# Patient Record
Sex: MALE | Race: WHITE | NOT HISPANIC OR LATINO | URBAN - METROPOLITAN AREA
[De-identification: names, ages, dates, MRNs, and addresses within clinical notes are randomized per-mention and may not be internally consistent; named-entity substitution may affect disease eponyms.]

---

## 2018-08-31 ENCOUNTER — EMERGENCY (EMERGENCY)
Facility: HOSPITAL | Age: 83
LOS: 1 days | End: 2018-08-31
Attending: EMERGENCY MEDICINE
Payer: MEDICARE

## 2018-08-31 VITALS
TEMPERATURE: 98 F | HEIGHT: 70 IN | SYSTOLIC BLOOD PRESSURE: 181 MMHG | DIASTOLIC BLOOD PRESSURE: 85 MMHG | HEART RATE: 61 BPM | WEIGHT: 179.9 LBS | OXYGEN SATURATION: 95 % | RESPIRATION RATE: 18 BRPM

## 2018-08-31 DIAGNOSIS — Z95.0 PRESENCE OF CARDIAC PACEMAKER: Chronic | ICD-10-CM

## 2018-08-31 PROBLEM — Z00.00 ENCOUNTER FOR PREVENTIVE HEALTH EXAMINATION: Status: ACTIVE | Noted: 2018-08-31

## 2018-08-31 LAB
ALBUMIN SERPL ELPH-MCNC: 4.3 G/DL — SIGNIFICANT CHANGE UP (ref 3.3–5)
ALP SERPL-CCNC: 43 U/L — SIGNIFICANT CHANGE UP (ref 40–120)
ALT FLD-CCNC: 17 U/L — SIGNIFICANT CHANGE UP (ref 10–45)
ANION GAP SERPL CALC-SCNC: 16 MMOL/L — SIGNIFICANT CHANGE UP (ref 5–17)
AST SERPL-CCNC: 23 U/L — SIGNIFICANT CHANGE UP (ref 10–40)
BASE EXCESS BLDV CALC-SCNC: -0.5 MMOL/L — SIGNIFICANT CHANGE UP (ref -2–2)
BASOPHILS # BLD AUTO: 0 K/UL — SIGNIFICANT CHANGE UP (ref 0–0.2)
BASOPHILS NFR BLD AUTO: 0.2 % — SIGNIFICANT CHANGE UP (ref 0–2)
BILIRUB SERPL-MCNC: 0.6 MG/DL — SIGNIFICANT CHANGE UP (ref 0.2–1.2)
BUN SERPL-MCNC: 17 MG/DL — SIGNIFICANT CHANGE UP (ref 7–23)
CA-I SERPL-SCNC: 1.18 MMOL/L — SIGNIFICANT CHANGE UP (ref 1.12–1.3)
CALCIUM SERPL-MCNC: 9.4 MG/DL — SIGNIFICANT CHANGE UP (ref 8.4–10.5)
CHLORIDE BLDV-SCNC: 107 MMOL/L — SIGNIFICANT CHANGE UP (ref 96–108)
CHLORIDE SERPL-SCNC: 101 MMOL/L — SIGNIFICANT CHANGE UP (ref 96–108)
CO2 BLDV-SCNC: 28 MMOL/L — SIGNIFICANT CHANGE UP (ref 22–30)
CO2 SERPL-SCNC: 21 MMOL/L — LOW (ref 22–31)
CREAT SERPL-MCNC: 1.03 MG/DL — SIGNIFICANT CHANGE UP (ref 0.5–1.3)
EOSINOPHIL # BLD AUTO: 0 K/UL — SIGNIFICANT CHANGE UP (ref 0–0.5)
EOSINOPHIL NFR BLD AUTO: 0.3 % — SIGNIFICANT CHANGE UP (ref 0–6)
GAS PNL BLDV: 135 MMOL/L — LOW (ref 136–145)
GAS PNL BLDV: SIGNIFICANT CHANGE UP
GAS PNL BLDV: SIGNIFICANT CHANGE UP
GLUCOSE BLDV-MCNC: 125 MG/DL — HIGH (ref 70–99)
GLUCOSE SERPL-MCNC: 123 MG/DL — HIGH (ref 70–99)
HCO3 BLDV-SCNC: 26 MMOL/L — SIGNIFICANT CHANGE UP (ref 21–29)
HCT VFR BLD CALC: 44.2 % — SIGNIFICANT CHANGE UP (ref 39–50)
HCT VFR BLDA CALC: 45 % — SIGNIFICANT CHANGE UP (ref 39–50)
HGB BLD CALC-MCNC: 14.8 G/DL — SIGNIFICANT CHANGE UP (ref 13–17)
HGB BLD-MCNC: 14.7 G/DL — SIGNIFICANT CHANGE UP (ref 13–17)
HOROWITZ INDEX BLDV+IHG-RTO: SIGNIFICANT CHANGE UP
LACTATE BLDV-MCNC: 2.4 MMOL/L — HIGH (ref 0.7–2)
LIDOCAIN IGE QN: 105 U/L — HIGH (ref 7–60)
LYMPHOCYTES # BLD AUTO: 0.9 K/UL — LOW (ref 1–3.3)
LYMPHOCYTES # BLD AUTO: 7.5 % — LOW (ref 13–44)
MCHC RBC-ENTMCNC: 32.8 PG — SIGNIFICANT CHANGE UP (ref 27–34)
MCHC RBC-ENTMCNC: 33.1 GM/DL — SIGNIFICANT CHANGE UP (ref 32–36)
MCV RBC AUTO: 98.9 FL — SIGNIFICANT CHANGE UP (ref 80–100)
MONOCYTES # BLD AUTO: 0.5 K/UL — SIGNIFICANT CHANGE UP (ref 0–0.9)
MONOCYTES NFR BLD AUTO: 4.5 % — SIGNIFICANT CHANGE UP (ref 2–14)
NEUTROPHILS # BLD AUTO: 10 K/UL — HIGH (ref 1.8–7.4)
NEUTROPHILS NFR BLD AUTO: 87.5 % — HIGH (ref 43–77)
PCO2 BLDV: 53 MMHG — HIGH (ref 35–50)
PH BLDV: 7.31 — LOW (ref 7.35–7.45)
PLATELET # BLD AUTO: 204 K/UL — SIGNIFICANT CHANGE UP (ref 150–400)
PO2 BLDV: 34 MMHG — SIGNIFICANT CHANGE UP (ref 25–45)
POTASSIUM BLDV-SCNC: 4.4 MMOL/L — SIGNIFICANT CHANGE UP (ref 3.5–5.3)
POTASSIUM SERPL-MCNC: 4.6 MMOL/L — SIGNIFICANT CHANGE UP (ref 3.5–5.3)
POTASSIUM SERPL-SCNC: 4.6 MMOL/L — SIGNIFICANT CHANGE UP (ref 3.5–5.3)
PROT SERPL-MCNC: 8 G/DL — SIGNIFICANT CHANGE UP (ref 6–8.3)
RBC # BLD: 4.47 M/UL — SIGNIFICANT CHANGE UP (ref 4.2–5.8)
RBC # FLD: 12.8 % — SIGNIFICANT CHANGE UP (ref 10.3–14.5)
SAO2 % BLDV: 54 % — LOW (ref 67–88)
SODIUM SERPL-SCNC: 138 MMOL/L — SIGNIFICANT CHANGE UP (ref 135–145)
TROPONIN T, HIGH SENSITIVITY RESULT: 29 NG/L — SIGNIFICANT CHANGE UP (ref 0–51)
WBC # BLD: 11.4 K/UL — HIGH (ref 3.8–10.5)
WBC # FLD AUTO: 11.4 K/UL — HIGH (ref 3.8–10.5)

## 2018-08-31 PROCEDURE — 96374 THER/PROPH/DIAG INJ IV PUSH: CPT | Mod: XU

## 2018-08-31 PROCEDURE — 99218: CPT

## 2018-08-31 PROCEDURE — 70450 CT HEAD/BRAIN W/O DYE: CPT

## 2018-08-31 PROCEDURE — 85027 COMPLETE CBC AUTOMATED: CPT

## 2018-08-31 PROCEDURE — 93010 ELECTROCARDIOGRAM REPORT: CPT

## 2018-08-31 PROCEDURE — 96361 HYDRATE IV INFUSION ADD-ON: CPT

## 2018-08-31 PROCEDURE — 82803 BLOOD GASES ANY COMBINATION: CPT

## 2018-08-31 PROCEDURE — 71045 X-RAY EXAM CHEST 1 VIEW: CPT

## 2018-08-31 PROCEDURE — 83690 ASSAY OF LIPASE: CPT

## 2018-08-31 PROCEDURE — 84295 ASSAY OF SERUM SODIUM: CPT

## 2018-08-31 PROCEDURE — 85014 HEMATOCRIT: CPT

## 2018-08-31 PROCEDURE — 99284 EMERGENCY DEPT VISIT MOD MDM: CPT | Mod: 25

## 2018-08-31 PROCEDURE — 74177 CT ABD & PELVIS W/CONTRAST: CPT | Mod: 26

## 2018-08-31 PROCEDURE — 71045 X-RAY EXAM CHEST 1 VIEW: CPT | Mod: 26

## 2018-08-31 PROCEDURE — 82947 ASSAY GLUCOSE BLOOD QUANT: CPT

## 2018-08-31 PROCEDURE — 74177 CT ABD & PELVIS W/CONTRAST: CPT

## 2018-08-31 PROCEDURE — 70450 CT HEAD/BRAIN W/O DYE: CPT | Mod: 26

## 2018-08-31 PROCEDURE — 80053 COMPREHEN METABOLIC PANEL: CPT

## 2018-08-31 PROCEDURE — 84484 ASSAY OF TROPONIN QUANT: CPT

## 2018-08-31 PROCEDURE — 82435 ASSAY OF BLOOD CHLORIDE: CPT

## 2018-08-31 PROCEDURE — 84132 ASSAY OF SERUM POTASSIUM: CPT

## 2018-08-31 PROCEDURE — G0378: CPT

## 2018-08-31 PROCEDURE — 93005 ELECTROCARDIOGRAM TRACING: CPT

## 2018-08-31 PROCEDURE — 83605 ASSAY OF LACTIC ACID: CPT

## 2018-08-31 PROCEDURE — 82330 ASSAY OF CALCIUM: CPT

## 2018-08-31 RX ORDER — ONDANSETRON 8 MG/1
4 TABLET, FILM COATED ORAL ONCE
Qty: 0 | Refills: 0 | Status: COMPLETED | OUTPATIENT
Start: 2018-08-31 | End: 2018-08-31

## 2018-08-31 RX ORDER — SODIUM CHLORIDE 9 MG/ML
3 INJECTION INTRAMUSCULAR; INTRAVENOUS; SUBCUTANEOUS ONCE
Qty: 0 | Refills: 0 | Status: COMPLETED | OUTPATIENT
Start: 2018-08-31 | End: 2018-08-31

## 2018-08-31 RX ORDER — MECLIZINE HCL 12.5 MG
25 TABLET ORAL ONCE
Qty: 0 | Refills: 0 | Status: COMPLETED | OUTPATIENT
Start: 2018-08-31 | End: 2018-08-31

## 2018-08-31 RX ORDER — SODIUM CHLORIDE 9 MG/ML
500 INJECTION INTRAMUSCULAR; INTRAVENOUS; SUBCUTANEOUS ONCE
Qty: 0 | Refills: 0 | Status: COMPLETED | OUTPATIENT
Start: 2018-08-31 | End: 2018-08-31

## 2018-08-31 RX ADMIN — ONDANSETRON 4 MILLIGRAM(S): 8 TABLET, FILM COATED ORAL at 17:44

## 2018-08-31 RX ADMIN — SODIUM CHLORIDE 500 MILLILITER(S): 9 INJECTION INTRAMUSCULAR; INTRAVENOUS; SUBCUTANEOUS at 17:44

## 2018-08-31 RX ADMIN — SODIUM CHLORIDE 3 MILLILITER(S): 9 INJECTION INTRAMUSCULAR; INTRAVENOUS; SUBCUTANEOUS at 22:49

## 2018-08-31 RX ADMIN — Medication 25 MILLIGRAM(S): at 17:44

## 2018-08-31 RX ADMIN — SODIUM CHLORIDE 500 MILLILITER(S): 9 INJECTION INTRAMUSCULAR; INTRAVENOUS; SUBCUTANEOUS at 18:30

## 2018-08-31 NOTE — ED CDU PROVIDER DISPOSITION NOTE - CLINICAL COURSE
83 M w HTN, DM, PPM, HLD presents w complaint of dizziness, nausea, vomiting x 6 and difficulty walking secondary to imbalance since 1330 today after eating a falafel. He the symptoms are also associated with mild diffuse abdominal pain. He ate the same meal as his son who is feeling well and accompanied him to the Emergency Room. He feels that he is ambulating better now than he was when he first arrived in the ED. He denies new hearing changes, tinnitus, fever or chills.  In ED patient treated with meclizine, zofran and ivf w/ improvement of symptoms. Labs remarkable for mild leukocytosis of 11.4, lactate 2.4, lipase 105 and trop 30 w/ delta 29. CT abd unremarkable. CT head resulted w/ scattered foci of air in  space, draining system and cavernous sinus. Neurosx was called and recommended repeat CT in 6 hours to eval resolution 83 M w HTN, DM, PPM, HLD presents w complaint of dizziness, nausea, vomiting x 6 and difficulty walking secondary to imbalance since 1330 today after eating a falafel. He the symptoms are also associated with mild diffuse abdominal pain. He ate the same meal as his son who is feeling well and accompanied him to the Emergency Room. He feels that he is ambulating better now than he was when he first arrived in the ED. He denies new hearing changes, tinnitus, fever or chills.  In ED patient treated with meclizine, zofran and ivf w/ improvement of symptoms. Labs remarkable for mild leukocytosis of 11.4, lactate 2.4, lipase 105 and trop 30 w/ delta 29. CT abd unremarkable. CT head resulted w/ scattered foci of air in  space, draining system and cavernous sinus. Neurosx was called and recommended repeat CT in 6 hours to eval resolution. CT resulted as resolution of previously seen foci of air. Discussed results w/ Dr. Kumar who paged neurosurgery to eval patient and make recommendations Dr. Ney Camejo neurosurgery has seen and evaluated patient. States that patient can be cleared for discharge given resolution of air foci as well as patient being asymptomatic. Recommends repeat head CT w/in 2 weeks to confirm. Discussed with Dr. Kumar who has seen and evaluated pt and agrees with plan for d/c home

## 2018-08-31 NOTE — ED PROVIDER NOTE - PHYSICAL EXAMINATION
Attending Syeda Rao: Gen: NAD, heent: atrauamtic, eomi, perrla, mmm, op pink, uvula midline, neck; nttp, no nuchal rigidity, chest: nttp, no crepitus, cv: rrr,  lungs: ctab, abd: soft, nontender, nondistended, no peritoneal signs, +BS, no guarding, ext: wwp, neg homans, skin: no rash, neuro: awake and alert, following commands, speech clear, sensation and strength intact, no focal deficits

## 2018-08-31 NOTE — ED CDU PROVIDER INITIAL DAY NOTE - PROGRESS NOTE DETAILS
CDU NOTE HEAVENLY Hassan: VSS NAD. Patient is resting comfortably and is without any complaints. CT resulted as resolution of previously seen foci of air. Discussed results w/ Dr. Kumar who paged neurosurgery to eval patient and make recommendations   Kimberly Hassan PA-C Dr. Ney Camejo neurosurgery has seen and evaluated patient. States that patient can be cleared for discharge given resolution of air foci as well as patient being asymptomatic. Recommends repeat head CT w/in 2 weeks to confirm. Discussed with Dr. Kumar who has seen and evaluated pt and agrees with plan for d/c home   Kimberly Hassan PA-C

## 2018-08-31 NOTE — ED CDU PROVIDER DISPOSITION NOTE - ATTENDING CONTRIBUTION TO CARE
MD Kumar:  I have personally performed a face to face diagnostic evaluation on this patient with the PA.  I have reviewed the ACP note and agree with the history, exam, and plan of care, except as noted.  History and Exam by me shows  84 yo M, sent to CDU after being worked up in the ED for dizzy episode.  HPI more c/w vertigo (room-spinning sensation) with associated nausea/vomiting, however, initial CT head demonstrated scattered foci of air in cavernous sinus.  Upon transfer to CDU his symptoms had completely resolved.  Sent CDU for interval repeat CT head, and NSGY recs (which were repeat CT).  Repeat CT demonstrated resolution of prior findings.  On exam the patient is asymptomatic, NAD, ambulates w/o difficulty.  Patient is reliable, and was given strict instructions to f/u as an outpatient with Neurology for repeat imaging in 2wk.  This CT finding is rarely indicative of significant pathology, and more likely inadvertent IV injection of air during ED phelbotomy/PIV placement.

## 2018-08-31 NOTE — ED CDU PROVIDER INITIAL DAY NOTE - DETAILS
Repeat head CT +/- neurosx, symptom control as needed, freq reeval, vitals q 4  -discussed with attending Dr. Galeano

## 2018-08-31 NOTE — ED ADULT NURSE REASSESSMENT NOTE - GENERAL PATIENT STATE
improvement verbalized/resting/sleeping/cooperative/family/SO at bedside/comfortable appearance
comfortable appearance/cooperative/family/SO at bedside/smiling/interactive

## 2018-08-31 NOTE — ED CDU PROVIDER INITIAL DAY NOTE - OBJECTIVE STATEMENT
83 M w HTN, DM, PPM, HLD presents w complaint of dizziness, nausea, vomiting x 6 and difficulty walking secondary to imbalance since 1330 today after eating a falafel. He the symptoms are also associated with mild diffuse abdominal pain. He ate the same meal as his son who is feeling well and accompanied him to the Emergency Room. He feels that he is ambulating better now than he was when he first arrived in the ED. He denies new hearing changes, tinnitus, fever or chills.  In ED patient treated with meclizine, zofran and ivf w/ improvement of symptoms. Labs remarkable for mild leukocytosis of 11.4, lactate 2.4, lipase 105 and trop 30 w/ delta 29. CT abd unremarkable. CT head resulted w/ scattered foci of air in  space, draining system and cavernous sinus. Neurosx was called and recommended repeat CT in 6 hours to eval resolution.

## 2018-08-31 NOTE — ED ADULT NURSE NOTE - OBJECTIVE STATEMENT
84 y/o M, PMH HTN, DM, HLD, paced, presents ambulatory to ED c/o n/v x 6, dizziness and difficulty walking due to dizziness since 1330 after eating a falafel. Pt also reports mild abdominal pain associated with nausea. Pt's brother ate the same meal and feels fine, he is here in the ED with pt. Pt denies nausea, abd pain and dizziness while he is sitting still, pt reports the nausea and dizziness worsen when he moves. Pt denies headache, visual changes, chest pain, palpitations, SOB, diarrhea, fevers, chills at this time. Safety maintained.

## 2018-08-31 NOTE — ED ADULT NURSE NOTE - NSIMPLEMENTINTERV_GEN_ALL_ED
Implemented All Universal Safety Interventions:  [place] to call system. Call bell, personal items and telephone within reach. Instruct patient to call for assistance. Room bathroom lighting operational. Non-slip footwear when patient is off stretcher. Physically safe environment: no spills, clutter or unnecessary equipment. Stretcher in lowest position, wheels locked, appropriate side rails in place.

## 2018-08-31 NOTE — ED PROVIDER NOTE - PROGRESS NOTE DETAILS
ATTG: Dr. Galeano: patient endorsed to me by Dr. Rao. awaiting ct head, labs, and re eval for dispo. Called by Radiology - ct head with air. patient denies trauma. will continue neurovasc monitoring and repeat ct head. discuss with neurosurgery. MD Kumar:  patient asymptomatic, repeat CT head demonstrates resolution of gas.  Awaiting cleareance by Neurosurgery.

## 2018-08-31 NOTE — ED CDU PROVIDER DISPOSITION NOTE - PLAN OF CARE
1. Follow up with your primary care doctor in 1-2 days  2. Take all your medications as prescribed. Additionally for dizziness take meclizine 1 tab every 8 hours and for nausea zofran 1 tab every 6 hours   3. Return to ED for any new or worsened symptoms

## 2018-08-31 NOTE — ED PROVIDER NOTE - OBJECTIVE STATEMENT
83 M w HTN, DM, PPM, HLD presents w complaint of dizziness, nausea, vomiting x 6 and difficulty walking secondary to imbalance since 1330 today after eating a falafel. He the symptoms are also associated with mild diffuse abdominal pain. He ate the same meal as his son who is feeling well and accompanied him to the Emergency Room. He feels that he is ambulating better now than he was when he first arrived in the ED. He denies new hearing changes, tinnitus, fever or chills.

## 2018-09-01 VITALS
SYSTOLIC BLOOD PRESSURE: 139 MMHG | OXYGEN SATURATION: 94 % | HEART RATE: 63 BPM | TEMPERATURE: 98 F | DIASTOLIC BLOOD PRESSURE: 69 MMHG | RESPIRATION RATE: 17 BRPM

## 2018-09-01 PROCEDURE — 70450 CT HEAD/BRAIN W/O DYE: CPT | Mod: 26

## 2018-09-01 PROCEDURE — 99217: CPT

## 2018-09-01 RX ORDER — MECLIZINE HCL 12.5 MG
1 TABLET ORAL
Qty: 9 | Refills: 0 | OUTPATIENT
Start: 2018-09-01 | End: 2018-09-03

## 2018-09-01 RX ORDER — ONDANSETRON 8 MG/1
1 TABLET, FILM COATED ORAL
Qty: 12 | Refills: 0 | OUTPATIENT
Start: 2018-09-01 | End: 2018-09-03

## 2018-09-01 NOTE — CONSULT NOTE ADULT - SUBJECTIVE AND OBJECTIVE BOX
p ([id number])     HPI: 83 M w HTN, DM, PPM, HLD presents w complaint of dizziness, nausea, vomiting x 6 and difficulty walking secondary to imbalance since 1330 today after eating a falafel. He the symptoms are also associated with mild diffuse abdominal pain. He ate the same meal as his son who is feeling well and accompanied him to the Emergency Room. He feels that he is ambulating better now than he was when he first arrived in the ED. He denies new hearing changes, tinnitus, fever or chills.    Patient denies headache, eye pain, eye redness, double vision, blurry vision, weakness numbness, trauma.     PAST MEDICAL HISTORY   DM (diabetes mellitus)  HLD (hyperlipidemia)  HTN (hypertension)    PAST SURGICAL HISTORY   Artificial cardiac pacemaker    penicillin (Unknown)      MEDICATIONS:  Antibiotics:    Neuro:    Anticoagulation:    Other:      SOCIAL HISTORY:   Occupation:   Marital Status:     FAMILY HISTORY:      REVIEW OF SYSTEMS:  negative but for hpi  General:  Eyes:  ENT:  Cardiac:  Respiratory:  GI:  Musculoskeletal:   Skin:  Neurologic:   Psychiatric:     PHYSICAL EXAMINATION:   T(C): 36.7 (09-01-18 @ 03:35), Max: 36.8 (08-31-18 @ 21:57)  HR: 63 (09-01-18 @ 03:35) (61 - 69)  BP: 139/69 (09-01-18 @ 03:35) (135/76 - 181/85)  RR: 17 (09-01-18 @ 03:35) (16 - 18)  SpO2: 94% (09-01-18 @ 03:35) (94% - 95%)  Wt(kg): --Height (cm): 177.8 (08-31 @ 15:40)  Weight (kg): 81.6 (08-31 @ 15:40)    General Examination:     Neurologic Examination:           AOx3, FC, PERRL, EOMI, V1-3 intact, no facial, palate mahad symmetric, tongue midline, shrug 5/5  5/5 throughout, no drift  SILT  No clonus or babinski      LABS:                        14.7   11.4  )-----------( 204      ( 31 Aug 2018 17:57 )             44.2     08-31    138  |  101  |  17  ----------------------------<  123<H>  4.6   |  21<L>  |  1.03    Ca    9.4      31 Aug 2018 17:57    TPro  8.0  /  Alb  4.3  /  TBili  0.6  /  DBili  x   /  AST  23  /  ALT  17  /  AlkPhos  43  08-31          RADIOLOGY & ADDITIONAL STUDIES:  IMPRESSION:  Interval resorption of droplets of gas in the infratemporal fossae since   the previous examination, likely from peripheral intravenous access. No   acute intracranial hemorrhage or mass effect.    If symptoms persist, correlate with neurologic evaluation to determine if   further evaluation with MRI is warranted, if there are no   contraindications.                KP PRIETO M.D., RADIOLOGY RESIDENT  This document has been electronically signed.  ILA VEE M.D., ATTENDING RADIOLOGIST  This document has been electronically signed. Sep  1 2018  2:37AM

## 2018-09-01 NOTE — ED ADULT NURSE REASSESSMENT NOTE - NS ED NURSE REASSESS COMMENT FT1
Pt discharged as per provider. Pt verbalizes understanding to discharge orders & will follow up with PMD post discharge. IV lock removed. No bleeding noted. Pt stable upon discharge.
Pt placed on 2L nasal cannula O2 as per HEAVENLY Santiago. Pt denies any changes at this time. Reports mild dizziness when he moves his head or stand up. Pt denies n/v at this time. Safety maintained, brother at bedside.
Pt back from CT, pt is AAOx4, NAD, resp nonlabored, resting comfortably in bed with brother at bedside. Pt reports some improvement in dizziness but reports that when he ambulated to the CT table with the CT tech he reports experiencing dizziness. Pt now denies nausea. Pt denies headache, chest pain, palpitations, SOB, abd pain, v/d, fevers, chills at this time. Pt awaiting CT results and dispo. Safety maintained.
Pt received from BRET Guzman. Pt oriented to CDU & plan of care was discussed. Pt denies any dizziness, abdominal pain, or N/V at the moment. Pt states he feels slightly off balance when ambulating but states it has improved. Pt aware to notify RN or PA of any dizziness prior to ambulation. Safety & comfort measures maintained. Call bell in reach. Will continue to monitor.

## 2018-09-01 NOTE — CONSULT NOTE ADULT - ASSESSMENT
83M here after episodes of n/v found to initial have evidence of air in the cavernous sinus completely resolved on f/u imaging. Patient neurologically intact with no headache, eye pain, double vision. Likely incidental in nautre. No acute neurosurgical intervention. F/u with Dr. Dimas as an outpatient.

## 2023-06-22 PROBLEM — E78.5 HYPERLIPIDEMIA, UNSPECIFIED: Chronic | Status: ACTIVE | Noted: 2018-08-31

## 2023-06-22 PROBLEM — I10 ESSENTIAL (PRIMARY) HYPERTENSION: Chronic | Status: ACTIVE | Noted: 2018-08-31

## 2023-06-22 PROBLEM — E11.9 TYPE 2 DIABETES MELLITUS WITHOUT COMPLICATIONS: Chronic | Status: ACTIVE | Noted: 2018-08-31

## 2023-06-30 ENCOUNTER — APPOINTMENT (OUTPATIENT)
Dept: ORTHOPEDIC SURGERY | Facility: CLINIC | Age: 88
End: 2023-06-30
Payer: MEDICARE

## 2023-06-30 ENCOUNTER — NON-APPOINTMENT (OUTPATIENT)
Age: 88
End: 2023-06-30

## 2023-06-30 VITALS
HEART RATE: 63 BPM | DIASTOLIC BLOOD PRESSURE: 84 MMHG | BODY MASS INDEX: 26.05 KG/M2 | HEIGHT: 70 IN | SYSTOLIC BLOOD PRESSURE: 110 MMHG | WEIGHT: 182 LBS

## 2023-06-30 PROCEDURE — 99204 OFFICE O/P NEW MOD 45 MIN: CPT

## 2023-06-30 RX ORDER — CLOPIDOGREL 75 MG/1
TABLET, FILM COATED ORAL
Refills: 0 | Status: ACTIVE | COMMUNITY

## 2023-06-30 RX ORDER — ASPIRIN 81 MG/1
81 TABLET, CHEWABLE ORAL
Refills: 0 | Status: ACTIVE | COMMUNITY

## 2023-06-30 RX ORDER — METOPROLOL SUCCINATE 25 MG/1
25 TABLET, EXTENDED RELEASE ORAL
Refills: 0 | Status: ACTIVE | COMMUNITY

## 2023-06-30 RX ORDER — ISOSORBIDE DINITRATE 5 MG/1
TABLET ORAL
Refills: 0 | Status: ACTIVE | COMMUNITY

## 2023-06-30 RX ORDER — METFORMIN HYDROCHLORIDE 1000 MG/1
1000 TABLET, FILM COATED, EXTENDED RELEASE ORAL
Refills: 0 | Status: ACTIVE | COMMUNITY

## 2023-06-30 RX ORDER — ROSUVASTATIN CALCIUM 20 MG/1
20 TABLET, FILM COATED ORAL
Refills: 0 | Status: ACTIVE | COMMUNITY

## 2023-06-30 RX ORDER — VALSARTAN 40 MG/1
TABLET, COATED ORAL
Refills: 0 | Status: ACTIVE | COMMUNITY

## 2023-06-30 RX ORDER — ALLOPURINOL 100 MG/1
100 TABLET ORAL
Refills: 0 | Status: ACTIVE | COMMUNITY

## 2023-06-30 RX ADMIN — COLLAGENASE CLOSTRIDIUM HISTOLYTICUM 0 MG: KIT at 00:00

## 2023-06-30 NOTE — HISTORY OF PRESENT ILLNESS
[Right] : right hand dominant [FreeTextEntry1] : He comes in today for evaluation of bilateral ring finger Dupuytren's disease x 1 year. He denies injury. The right ring finger is worse than the left ring finger. He has a notable flexion contracture at the right PIP joint and a less severe contracture at the left ring finger PIP joint. \par \par He is a type II diabetic.\par \par He has a cardiac history and has a pacemaker. He takes a baby aspirin daily. \par \par He is accompanied by his wife today.\par \par He was referred by a patient of Zafar carter.

## 2023-06-30 NOTE — DISCUSSION/SUMMARY
[FreeTextEntry1] : He has findings consistent with bilateral Dupuytren's disease with a significant left ring finger contracture and a more severe right ring finger contracture.\par \par I had a discussion with the patient regarding today's visit, the prognosis of this diagnosis, and treatment recommendations and options. At this time, we discussed the nature and etiology of Dupuytren's in great detail. Given the progression of the disease and the notable PIP joint flexion contracture he has developed, right greater than left, we discussed various treatment options. I did tell him that treatment options would consist of either operative management of a partial fasciectomy versus nonoperative management of a Xiaflex injection and attempted manipulation. I told him that my recommendation would be the Xiaflex injection as if this is ultimately not successful, he can always under surgery at a later point in time. He is in agreement and has opted to proceed with the Xiaflex injection. I recommended he first undergo the procedure at the left ring finger as the disease is less advanced on this side and it will be easier than the right side. He stated he is fine with this but would eventually like to have the procedure performed at the right side as well. I told him this is fine but would wait until he fully recovers from the left side. Prior authorization for the Xiaflex injection will be requested from his insurance company and he will be contacted by my office, once approved. \par \par I discussed associated risks and benefits inherent in a Xiaflex injection, including, but not limited to, infection, possible digital nerve or vessel injury, possible flexor tendon injury and/or rupture, possible skin tearing during rupture of the cord, possible pain, swelling and ecchymosis of the hand and upper extremity and possible proximal lymphadenopathy.  I also discussed the possibility that the Xiaflex injection may not be successful and that if it is not, then further treatment, including surgery, may be necessary.  We also discussed the chance of recurrence of the Dupuytren's disease and contracture.\par \par My cumulative time spent on this visit included: Preparation for the visit, review of the medical records, review of pertinent diagnostic studies, examination and counseling of the patient on the above diagnosis, treatment plan and prognosis, orders of diagnostic tests, medication and/or appropriate procedures and documentation in the medical records of today's visit.

## 2023-06-30 NOTE — END OF VISIT
[FreeTextEntry3] : This note was written by Ludivina Perez on 06/30/2023 acting solely as a scribe for Dr. Paco Olsen.\par  \par All medical record entries made by the Scribe were at my, Dr. Paco Olsen, direction and personally dictated by me on 06/30/2023. I have personally reviewed the chart and agree that the record accurately reflects my personal performance of the history, physical exam, assessment and plan.

## 2023-06-30 NOTE — PHYSICAL EXAM
[de-identified] : - Constitutional: This is a male in no obvious distress.  He is accompanied by his wife today.\par - Psych: Patient is alert and oriented to person, place and time.  Patient has a normal mood and affect.\par - Cardiovascular: Normal pulses throughout the upper extremities.  No significant varicosities are noted in the upper extremities. \par - Neuro: Strength and sensation are intact throughout the upper extremities.  Patient has normal coordination.\par - Respiratory:  Patient exhibits no evidence of shortness of breath or difficulty breathing.\par - Skin: No rashes, lesions, or other abnormalities are noted in the upper extremities.\par \par --- \par \par Examination of his right hand demonstrates Dupuytren's disease with a significant contracture of the ring finger.  He has a 90 degree PIP joint flexion contracture and a 30 degree MCP joint flexion contracture present ulnar digital cord.  He has a positive tabletop test.  He is neurovascularly intact distally.\par \par Examination of his left hand demonstrates Dupuytren's disease involving the ring finger with a 30 degree PIP joint flexion contracture and a 20 degree MCP joint flexion contracture.  He has a longitudinal cord in the palm.  There is a positive tabletop test.  She is neurovascularly intact distally.

## 2023-06-30 NOTE — ADDENDUM
[FreeTextEntry1] : I, Ludivina Perez, acted solely as a scribe for Dr. Olsen on this date on 06/30/2023.

## 2023-07-03 RX ORDER — COLLAGENASE CLOSTRIDIUM HISTOLYTICUM 0.9 MG
0.9 KIT INJECTION
Qty: 1 | Refills: 0 | Status: ACTIVE | OUTPATIENT
Start: 2023-07-03

## 2023-08-04 ENCOUNTER — NON-APPOINTMENT (OUTPATIENT)
Age: 88
End: 2023-08-04

## 2023-08-16 ENCOUNTER — APPOINTMENT (OUTPATIENT)
Dept: ORTHOPEDIC SURGERY | Facility: CLINIC | Age: 88
End: 2023-08-16
Payer: MEDICARE

## 2023-08-16 PROCEDURE — 20527 NJX NZM PALMAR FASCIAL CORD: CPT | Mod: F3

## 2023-08-16 NOTE — PROCEDURE
[FreeTextEntry1] : Using sterile technique, a left ring finger digital block was performed with 1% plain lidocaine.  After the finger was anesthetized, using standard technique and sterile technique, the left ring finger  Dupuytren's cord  was injected with 0.25 mL of the reconstituted Xiaflex solution.  He tolerated the procedure well without complications.  He was told that the hand will likely become swollen and ecchymotic.  He was instructed on ice and elevation.  He will follow-up in 2 days for attempted rupture of the cord.

## 2023-08-16 NOTE — PHYSICAL EXAM
[de-identified] : - Constitutional: This is a male in no obvious distress.  He is accompanied by his wife today.\par  - Psych: Patient is alert and oriented to person, place and time.  Patient has a normal mood and affect.\par  - Cardiovascular: Normal pulses throughout the upper extremities.  No significant varicosities are noted in the upper extremities. \par  - Neuro: Strength and sensation are intact throughout the upper extremities.  Patient has normal coordination.\par  - Respiratory:  Patient exhibits no evidence of shortness of breath or difficulty breathing.\par  - Skin: No rashes, lesions, or other abnormalities are noted in the upper extremities.\par  \par  --- \par  \par  Examination of his right hand demonstrates Dupuytren's disease with a significant contracture of the ring finger.  He has a 90 degree PIP joint flexion contracture and a 30 degree MCP joint flexion contracture present ulnar digital cord.  He has a positive tabletop test.  He is neurovascularly intact distally.\par  \par  Examination of his left hand demonstrates Dupuytren's disease involving the ring finger with a 30 degree PIP joint flexion contracture and a 20 degree MCP joint flexion contracture.  He has a longitudinal cord in the palm.  There is a positive tabletop test.  She is neurovascularly intact distally.

## 2023-08-16 NOTE — ADDENDUM
[FreeTextEntry1] :  I, Shawn Churchill, acted solely as a scribe for Dr. Olsen on this date on 08/16/2023.

## 2023-08-16 NOTE — DISCUSSION/SUMMARY
[FreeTextEntry1] :   I had a discussion regarding today's visit, the diagnosis and treatment recomendations and options.  We also discussed changes since the last visit.  At this time, He agreed to proceed with a Xiaflex injection at the left ring finger.  I discussed associated risks and benefits inherent in a Xiaflex injection, including, but not limited to, infection, possible digital nerve or vessel injury, possible flexor tendon injury and/or rupture, possible skin tearing during rupture of the cord, possible pain, swelling and ecchymosis of the hand and upper extremity and possible proximal lymphadenopathy.  I also discussed the possibility that the Xiaflex injection may not be successful and that if it is not, then further treatment, including surgery, may be necessary.  We also discussed the chance of recurrence of the Dupuytren's disease and contracture.  The patient has agreed to the above plan of management and has expressed full understanding.  All questions were fully answered to the patient's satisfaction.  My cumulative time spent on today's visit was greater than 30 minutes and included: Preparation for the visit, review of the medical records, review of pertinent diagnostic studies, examination and counseling of the patient on the above diagnosis, treatment plan and prognosis, orders of diagnostic tests, medications and/or appropriate procedures and documentation in the medical records of today's visit.

## 2023-08-16 NOTE — HISTORY OF PRESENT ILLNESS
[FreeTextEntry1] :  Follow-up regarding bilateral Dupuytren's disease with a significant left ring finger contracture and a more severe right ring finger contracture.   see note from when he was seen in the office 1 1/2 months ago.  He is here for a Xiaflex injection at the left ring finger.  He is a type II diabetic.  He has a cardiac history and has a pacemaker. He takes a baby aspirin daily.   He is accompanied by his wife today.  He was referred by a patient of Zafar carter.

## 2023-08-16 NOTE — END OF VISIT
[FreeTextEntry3] : This note was written by Shawn Churchill on 08/16/2023 acting solely as a scribe for Dr. Paco Olsen.   All medical record entries made by the Scribe were at my, Dr. Paco Olsen, direction and personally dictated by me on 08/16/2023. I have personally reviewed the chart and agree that the record accurately reflects my personal performance of the history, physical exam, assessment and plan.

## 2023-08-18 ENCOUNTER — APPOINTMENT (OUTPATIENT)
Dept: ORTHOPEDIC SURGERY | Facility: CLINIC | Age: 88
End: 2023-08-18
Payer: COMMERCIAL

## 2023-08-18 ENCOUNTER — NON-APPOINTMENT (OUTPATIENT)
Age: 88
End: 2023-08-18

## 2023-08-18 PROCEDURE — 99214 OFFICE O/P EST MOD 30 MIN: CPT | Mod: 25

## 2023-08-18 PROCEDURE — 26341 MANIPULAT PALM CORD POST INJ: CPT | Mod: F3

## 2023-08-18 NOTE — PHYSICAL EXAM
[de-identified] : - Constitutional: This is a male in no obvious distress.  He is accompanied by his wife today. - Psych: Patient is alert and oriented to person, place and time.  Patient has a normal mood and affect. - Cardiovascular: Normal pulses throughout the upper extremities.  No significant varicosities are noted in the upper extremities.  - Neuro: Strength and sensation are intact throughout the upper extremities.  Patient has normal coordination. - Respiratory:  Patient exhibits no evidence of shortness of breath or difficulty breathing. - Skin: No rashes, lesions, or other abnormalities are noted in the upper extremities.  ---   Examination of his right hand demonstrates Dupuytren's disease with a significant contracture of the ring finger.  He has a 90 degree PIP joint flexion contracture and a 30 degree MCP joint flexion contracture present ulnar digital cord.  He has a positive tabletop test.  He is neurovascularly intact distally.  Examination of his left hand demonstrates diffuse swelling and ecchymosis.  His flexor tendons are intact.  His ring finger cord has not ruptured.  He is neurovascularly intact distally.

## 2023-08-18 NOTE — DISCUSSION/SUMMARY
[FreeTextEntry1] :   I had a discussion regarding today's visit, the diagnosis and treatment recomendations and options.  We also discussed changes since the last visit.  At this time, he agreed to proceed with attempted rupture of the cord, knowing the risks and benefits and that this may not be successful.  The patient has agreed to the above plan of management and has expressed full understanding.  All questions were fully answered to the patient's satisfaction.  My cumulative time spent on today's visit was greater than 30 minutes and included: Preparation for the visit, review of the medical records, review of pertinent diagnostic studies, examination and counseling of the patient on the above diagnosis, treatment plan and prognosis, orders of diagnostic tests, medications and/or appropriate procedures and documentation in the medical records of today's visit.

## 2023-08-18 NOTE — PROCEDURE
[FreeTextEntry1] : Using sterile technique, a left ring finger digital block was performed with 1% plain lidocaine.  After the finger was anesthetized, using standard technique, the cord was ruptured.  Near full extension was achieved.  He tolerated the procedure well without complications.  He was instructed on ice, elevation and active and passive flexion and extension exercises.  He will follow-up in 2 weeks.

## 2023-08-18 NOTE — HISTORY OF PRESENT ILLNESS
[FreeTextEntry1] :  2 days status post left ring finger Xiaflex injection.  He is here for rupture of the cord.  He is doing well and has swelling but no pain  He is a type II diabetic.  He has a cardiac history and has a pacemaker. He takes a baby aspirin daily.   He is accompanied by his wife today.  He was referred by a patient of Zafar carter.

## 2023-08-30 ENCOUNTER — APPOINTMENT (OUTPATIENT)
Dept: ORTHOPEDIC SURGERY | Facility: CLINIC | Age: 88
End: 2023-08-30
Payer: COMMERCIAL

## 2023-08-30 PROCEDURE — 99213 OFFICE O/P EST LOW 20 MIN: CPT

## 2023-08-30 RX ADMIN — COLLAGENASE CLOSTRIDIUM HISTOLYTICUM 0 MG: KIT at 00:00

## 2023-08-30 NOTE — DISCUSSION/SUMMARY
[FreeTextEntry1] : I had a discussion regarding today's visit, the diagnosis and treatment recommendations and options.  We also discussed changes since the last visit.    With regard to his left ring finger, he and his wife were instructed on active and passive flexion and more important extension exercises.  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  I will request authorization.  He does understand that as he has a significant contracture, much more so than the left side, the risks are increased, the  recovery is prolonged, and he may have tearing of the skin and require splinting after the procedure.  The patient has agreed to the above plan of management and has expressed full understanding.  All questions were fully answered to the patient's satisfaction.  My cumulative time spent on today's visit was greater than 30 minutes and included: Preparation for the visit, review of the medical records, review of pertinent diagnostic studies, examination and counseling of the patient on the above diagnosis, treatment plan and prognosis, orders of diagnostic tests, medications and/or appropriate procedures and documentation in the medical records of today's visit.

## 2023-08-30 NOTE — PHYSICAL EXAM
[de-identified] : - Constitutional: This is a male in no obvious distress.  He is accompanied by his wife today. - Psych: Patient is alert and oriented to person, place and time.  Patient has a normal mood and affect. - Cardiovascular: Normal pulses throughout the upper extremities.  No significant varicosities are noted in the upper extremities.  - Neuro: Strength and sensation are intact throughout the upper extremities.  Patient has normal coordination. - Respiratory:  Patient exhibits no evidence of shortness of breath or difficulty breathing. - Skin: No rashes, lesions, or other abnormalities are noted in the upper extremities.  ---   Examination of his right hand demonstrates Dupuytren's disease with a significant contracture of the ring finger.  He has a 90 degree PIP joint flexion contracture and a 30 degree MCP joint flexion contracture present ulnar digital cord.  He has a positive tabletop test.  He is neurovascularly intact distally.  Examination of his left hand demonstrates no residual swelling.  He has a mild residual PIP joint contracture but his finger is much straighter.  His flexor tendons are intact.  He is neurovascularly intact distally.

## 2023-08-30 NOTE — HISTORY OF PRESENT ILLNESS
[FreeTextEntry1] : 2 weeks status post left ring finger Xiaflex injection and 12 days status post cord rupture.  He is overall doing well today.   He is a type II diabetic.  He has a cardiac history and has a pacemaker. He takes a baby aspirin daily.   He is accompanied by his wife today.  He was referred by a patient of Zafar carter.

## 2023-08-30 NOTE — END OF VISIT
[FreeTextEntry3] : This note was written by Shawn Churchill on 08/30/2023 acting solely as a scribe for Dr. Paco Olsen.   All medical record entries made by the Scribe were at my, Dr. Paco Olsen, direction and personally dictated by me on 08/30/2023. I have personally reviewed the chart and agree that the record accurately reflects my personal performance of the history, physical exam, assessment and plan.

## 2023-08-30 NOTE — ADDENDUM
[FreeTextEntry1] :  I, Shawn Churchill, acted solely as a scribe for Dr. Olsen on this date on 08/30/2023.

## 2023-09-19 ENCOUNTER — NON-APPOINTMENT (OUTPATIENT)
Age: 88
End: 2023-09-19

## 2023-09-20 RX ORDER — COLLAGENASE CLOSTRIDIUM HISTOLYTICUM 0.9 MG
0.9 KIT INJECTION
Qty: 1 | Refills: 0 | Status: ACTIVE | OUTPATIENT
Start: 2023-09-05

## 2023-09-27 ENCOUNTER — APPOINTMENT (OUTPATIENT)
Dept: ORTHOPEDIC SURGERY | Facility: CLINIC | Age: 88
End: 2023-09-27
Payer: MEDICARE

## 2023-09-27 PROCEDURE — 20527 NJX NZM PALMAR FASCIAL CORD: CPT | Mod: RT

## 2023-09-27 PROCEDURE — 99214 OFFICE O/P EST MOD 30 MIN: CPT | Mod: 25

## 2023-09-29 ENCOUNTER — NON-APPOINTMENT (OUTPATIENT)
Age: 88
End: 2023-09-29

## 2023-09-29 ENCOUNTER — APPOINTMENT (OUTPATIENT)
Dept: ORTHOPEDIC SURGERY | Facility: CLINIC | Age: 88
End: 2023-09-29
Payer: MEDICARE

## 2023-09-29 PROCEDURE — 99214 OFFICE O/P EST MOD 30 MIN: CPT | Mod: 25

## 2023-09-29 PROCEDURE — 26341 MANIPULAT PALM CORD POST INJ: CPT | Mod: F8,RT

## 2023-09-29 RX ORDER — LIDOCAINE HYDROCHLORIDE 10 MG/ML
1 INJECTION, SOLUTION INFILTRATION; PERINEURAL
Refills: 0 | Status: COMPLETED | OUTPATIENT
Start: 2023-09-29

## 2023-09-29 RX ADMIN — Medication %: at 00:00

## 2023-10-04 PROBLEM — M72.0 DUPUYTREN'S DISEASE OF PALM OF BOTH HANDS: Status: ACTIVE | Noted: 2023-06-30

## 2023-10-11 ENCOUNTER — APPOINTMENT (OUTPATIENT)
Dept: ORTHOPEDIC SURGERY | Facility: CLINIC | Age: 88
End: 2023-10-11
Payer: MEDICARE

## 2023-10-11 DIAGNOSIS — M72.0 PALMAR FASCIAL FIBROMATOSIS [DUPUYTREN]: ICD-10-CM

## 2023-10-11 PROCEDURE — 99213 OFFICE O/P EST LOW 20 MIN: CPT

## 2025-08-29 ENCOUNTER — APPOINTMENT (OUTPATIENT)
Dept: ORTHOPEDIC SURGERY | Facility: CLINIC | Age: 89
End: 2025-08-29